# Patient Record
Sex: MALE | Race: OTHER | HISPANIC OR LATINO | Employment: FULL TIME | URBAN - METROPOLITAN AREA
[De-identification: names, ages, dates, MRNs, and addresses within clinical notes are randomized per-mention and may not be internally consistent; named-entity substitution may affect disease eponyms.]

---

## 2018-04-06 ENCOUNTER — HOSPITAL ENCOUNTER (EMERGENCY)
Facility: HOSPITAL | Age: 28
Discharge: HOME | End: 2018-04-06
Admitting: EMERGENCY MEDICINE
Payer: OTHER MISCELLANEOUS

## 2018-04-06 ENCOUNTER — APPOINTMENT (EMERGENCY)
Dept: RADIOLOGY | Facility: HOSPITAL | Age: 28
End: 2018-04-06
Payer: OTHER MISCELLANEOUS

## 2018-04-06 VITALS
OXYGEN SATURATION: 99 % | WEIGHT: 140 LBS | DIASTOLIC BLOOD PRESSURE: 89 MMHG | RESPIRATION RATE: 16 BRPM | SYSTOLIC BLOOD PRESSURE: 139 MMHG | TEMPERATURE: 98.3 F | HEART RATE: 81 BPM
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
## 2018-04-06 DIAGNOSIS — S62.639B OPEN FRACTURE OF TUFT OF DISTAL PHALANX OF FINGER: ICD-10-CM

## 2018-04-06 PROCEDURE — 73140 X-RAY EXAM OF FINGER(S): CPT

## 2018-04-06 PROCEDURE — 90471 IMMUNIZATION ADMIN: CPT

## 2018-04-06 PROCEDURE — 90715 TDAP VACCINE 7 YRS/> IM: CPT | Performed by: PHYSICIAN ASSISTANT

## 2018-04-06 PROCEDURE — 99283 EMERGENCY DEPT VISIT LOW MDM: CPT

## 2018-04-06 RX ORDER — AMOXICILLIN AND CLAVULANATE POTASSIUM 875; 125 MG/1; MG/1
1 TABLET, FILM COATED ORAL EVERY 12 HOURS SCHEDULED
Status: DISCONTINUED | OUTPATIENT
Start: 2018-04-06 | End: 2018-04-06

## 2018-04-06 RX ORDER — IBUPROFEN 400 MG/1
400 TABLET ORAL EVERY 6 HOURS PRN
Qty: 30 TABLET | Refills: 0 | Status: SHIPPED | OUTPATIENT
Start: 2018-04-06

## 2018-04-06 RX ORDER — LIDOCAINE HYDROCHLORIDE 10 MG/ML
5 INJECTION, SOLUTION EPIDURAL; INFILTRATION; INTRACAUDAL; PERINEURAL ONCE
Status: COMPLETED | OUTPATIENT
Start: 2018-04-06 | End: 2018-04-06

## 2018-04-06 RX ORDER — AMOXICILLIN AND CLAVULANATE POTASSIUM 875; 125 MG/1; MG/1
1 TABLET, FILM COATED ORAL EVERY 12 HOURS SCHEDULED
Qty: 20 TABLET | Refills: 0 | Status: SHIPPED | OUTPATIENT
Start: 2018-04-06 | End: 2018-04-16

## 2018-04-06 RX ORDER — ACETAMINOPHEN 500 MG
500 TABLET ORAL EVERY 6 HOURS PRN
Qty: 30 TABLET | Refills: 0 | Status: SHIPPED | OUTPATIENT
Start: 2018-04-06

## 2018-04-06 RX ORDER — ACETAMINOPHEN 325 MG/1
975 TABLET ORAL ONCE
Status: COMPLETED | OUTPATIENT
Start: 2018-04-06 | End: 2018-04-06

## 2018-04-06 RX ORDER — IBUPROFEN 400 MG/1
800 TABLET ORAL ONCE
Status: COMPLETED | OUTPATIENT
Start: 2018-04-06 | End: 2018-04-06

## 2018-04-06 RX ORDER — AMOXICILLIN AND CLAVULANATE POTASSIUM 875; 125 MG/1; MG/1
1 TABLET, FILM COATED ORAL ONCE
Status: COMPLETED | OUTPATIENT
Start: 2018-04-06 | End: 2018-04-06

## 2018-04-06 RX ADMIN — ACETAMINOPHEN 975 MG: 325 TABLET, FILM COATED ORAL at 01:30

## 2018-04-06 RX ADMIN — IBUPROFEN 800 MG: 400 TABLET ORAL at 01:29

## 2018-04-06 RX ADMIN — AMOXICILLIN AND CLAVULANATE POTASSIUM 1 TABLET: 875; 125 TABLET, FILM COATED ORAL at 03:09

## 2018-04-06 RX ADMIN — LIDOCAINE HYDROCHLORIDE 5 ML: 10 INJECTION, SOLUTION EPIDURAL; INFILTRATION; INTRACAUDAL; PERINEURAL at 02:25

## 2018-04-06 RX ADMIN — TETANUS TOXOID, REDUCED DIPHTHERIA TOXOID AND ACELLULAR PERTUSSIS VACCINE, ADSORBED 0.5 ML: 5; 2.5; 8; 8; 2.5 SUSPENSION INTRAMUSCULAR at 01:19

## 2018-04-06 NOTE — ED PROVIDER NOTES
History  Chief Complaint   Patient presents with    Finger Injury     Pt brought in by EMS, reports cutting R index finger on a machine while at work, bleeding controlled upon arrival to ED  Finger Laceration   Location:  Finger  Finger laceration location:  R index finger  Length:  1 cm  Depth: Through dermis  Quality: avulsion and jagged    Bleeding: venous    Laceration mechanism:  Metal edge (machine)  Pain details:     Quality:  Aching, sharp and throbbing    Severity:  Moderate    Timing:  Constant    Progression:  Unchanged  Associated symptoms: no fever        None       History reviewed  No pertinent past medical history  History reviewed  No pertinent surgical history  History reviewed  No pertinent family history  I have reviewed and agree with the history as documented  Social History   Substance Use Topics    Smoking status: Never Smoker    Smokeless tobacco: Never Used    Alcohol use Yes        Review of Systems   Constitutional: Negative for chills and fever  HENT: Negative for ear pain  Eyes: Negative for pain  Respiratory: Negative for chest tightness  Cardiovascular: Negative for chest pain  Gastrointestinal: Negative for abdominal pain  Endocrine: Negative for polydipsia  Genitourinary: Negative for flank pain  Musculoskeletal: Negative for back pain  Skin: Positive for wound  Finger right pointer   Allergic/Immunologic: Negative for food allergies  Neurological: Negative for headaches  Hematological: Negative for adenopathy  Psychiatric/Behavioral: Negative for behavioral problems         Physical Exam  ED Triage Vitals [04/06/18 0044]   Temperature Pulse Respirations Blood Pressure SpO2   98 3 °F (36 8 °C) 81 16 139/89 99 %      Temp Source Heart Rate Source Patient Position - Orthostatic VS BP Location FiO2 (%)   Oral Monitor Lying Left arm --      Pain Score       4           Orthostatic Vital Signs  Vitals:    04/06/18 0044   BP: 139/89 Pulse: 81   Patient Position - Orthostatic VS: Lying       Physical Exam   Constitutional: He is oriented to person, place, and time  He appears well-developed and well-nourished  No distress  HENT:   Head: Normocephalic and atraumatic  Eyes: Conjunctivae are normal    Neck: Neck supple  No JVD present  Cardiovascular: Normal rate  Pulmonary/Chest: Effort normal    Abdominal: Soft  He exhibits no distension  Genitourinary:   Genitourinary Comments: No complaints deferred   Musculoskeletal:   Please see procedure note for description of finger injury as well as pictures   Neurological: He is alert and oriented to person, place, and time  Skin: Skin is warm and dry  Capillary refill takes less than 2 seconds  He is not diaphoretic  ED Medications  Medications   tetanus-diphtheria-acellular pertussis (BOOSTRIX) IM injection 0 5 mL (0 5 mL Intramuscular Given 4/6/18 0119)   ibuprofen (MOTRIN) tablet 800 mg (800 mg Oral Given 4/6/18 0129)   acetaminophen (TYLENOL) tablet 975 mg (975 mg Oral Given 4/6/18 0130)   lidocaine (PF) (XYLOCAINE-MPF) 1 % injection 5 mL (5 mL Infiltration Given by Other 4/6/18 0225)   amoxicillin-clavulanate (AUGMENTIN) 875-125 mg per tablet 1 tablet (1 tablet Oral Given 4/6/18 0309)       Diagnostic Studies  Results Reviewed     None                 XR finger second digit-index RIGHT   Final Result by Benigno Amador DO (04/06 0200)      Mildly displaced fracture of the distal tuft of the 2nd digit            Workstation performed: [id number]                    Procedures  Lac Repair  Date/Time: 4/6/2018 2:25 AM  Performed by: Ankit Heller by: Teddy Ray   Consent: Verbal consent obtained    Risks and benefits: risks, benefits and alternatives were discussed  Consent given by: patient  Patient understanding: patient states understanding of the procedure being performed  Patient consent: the patient's understanding of the procedure matches consent given  Patient identity confirmed: arm band, provided demographic data and verbally with patient  Body area: upper extremity  Location details: right index finger  Laceration length: 1 cm  Foreign bodies: no foreign bodies  Vascular damage: no  Anesthesia: local infiltration    Anesthesia:  Local Anesthetic: lidocaine 1% without epinephrine  Anesthetic total: 3 mL    Sedation:  Patient sedated: no    Wound Dehiscence:  Superficial Wound Dehiscence: simple closure      Procedure Details:  Preparation: Patient was prepped and draped in the usual sterile fashion  Irrigation solution: tap water  Skin closure: 4-0 nylon  Number of sutures: 1  Technique: simple  Approximation: loose             Phone Contacts  ED Phone Contact    ED Course  ED Course                                MDM  Number of Diagnoses or Management Options  Open fracture of tuft of distal phalanx of finger:   Work related injury:   Diagnosis management comments: 70-year-old male presents emergency department for work related injury to finger  X-rays reviewed  Wound cleaning closed without difficulty  One suture was utilized  Patient encouraged to follow up with occupational medicine for this work related injury  I did consider this and opened fracture and place patient on antibiotics  I did educate patient on the reasoning for antibiotics  Patient was educated on persistent or worsening signs of symptoms and to follow up with occupational medicine or return to the emergency department  Patient was also educated on timing for suture removal   Patient admits understanding and agreement  Patient was discharged in stable and improved condition         Amount and/or Complexity of Data Reviewed  Tests in the radiology section of CPT®: ordered and reviewed      CritCare Time    Disposition  Final diagnoses:   Work related injury   Open fracture of tuft of distal phalanx of finger - Right pointer finger     Time reflects when diagnosis was documented in both MDM as applicable and the Disposition within this note     Time User Action Codes Description Comment    4/6/2018  1:12 AM Kamar Bai Add [Y99 0] Work related injury     4/6/2018  2:17 AM Bard David Emanuel Add [S62 639B] Open fracture of tuft of distal phalanx of finger     4/6/2018  2:17 AM Bard David Emanuel Modify [O52 416B] Open fracture of tuft of distal phalanx of finger Right pointer finger      ED Disposition     ED Disposition Condition Comment    Discharge  Varsha Shorten discharge to home/self care  Condition at discharge: Stable        Follow-up Information     Follow up With Specialties Details Why Contact Info    Sarah Harrison MD Occupational Medicine   [address] [address]  [phone]          Discharge Medication List as of 4/6/2018  2:58 AM      START taking these medications    Details   acetaminophen (TYLENOL) 500 mg tablet Take 1 tablet (500 mg total) by mouth every 6 (six) hours as needed for mild pain, moderate pain, headaches or fever, Starting Fri 4/6/2018, Print      amoxicillin-clavulanate (AUGMENTIN) 875-125 mg per tablet Take 1 tablet by mouth every 12 (twelve) hours for 10 days, Starting Fri 4/6/2018, Until Mon 4/16/2018, Print      ibuprofen (MOTRIN) 400 mg tablet Take 1 tablet (400 mg total) by mouth every 6 (six) hours as needed for mild pain, Starting Fri 4/6/2018, Print           No discharge procedures on file      ED Provider  Electronically Signed by           Brady Choi PA-C  04/09/18 0413

## 2018-04-06 NOTE — DISCHARGE INSTRUCTIONS
Fractura de dedo   CUIDADO AMBULATORIO:   Paige fractura de dedo  es la [place] de 1 o más huesos del dedo de ennis hijo  La causa más común es un golpe directo al dedo  Los signos y síntomas más comunes incluyen los siguientes:   · Moretones, dolor o inflamación    · Debilidad o entumecimiento    · Dificultad para  ennis dedo    · El dedo se ve de forma anormal  Busque atención médica de inmediato si:   · El yeso o la férula se moja, se daña o se le   · El yeso o la férula se sienten muy apretados  · Usted tiene dolor intenso  · El dedo lesionado se encuentra entumecido frío o pálido  Comuníquese con ennis médico o especialista de la mano si:   · Aun después del tratamiento, empeora ennis dolor o inflamación  · Usted tiene preguntas o inquietudes acerca de ennis condición o cuidado  Tratamiento:   · Nasima Priestly o un yeso  puede ser necesaria para prevenir movimiento y proteger el dedo por lo que Sherrian Antigo  · AINEs (Analgésicos antiinflamatorios no esteroides) rey el ibuprofeno, ayudan a disminuir la inflamación, el dolor y la [place]  Amada medicamento esta disponible con o sin paige receta médica  Los AINEs pueden causar sangrado estomacal o problemas renales en ciertas personas  Si usted roxana un medicamento anticoagulante, siempre pregúntele a ennis médico si los ROSEANN son seguros para usted  Siempre mikaela la etiqueta de amada medicamento y [place] instrucciones  · El acetaminofén  [place] el dolor y baja la fiebre  Está disponible sin receta médica  Pregunte la cantidad y la frecuencia con que debe tomarlos  [address]  El acetaminofén puede causar daño en el hígado cuando no se roxana de forma correcta  · Un medicamento con receta para el dolor  podrían ser Dejan Mario  Pregunte al médico cómo debe dom amada medicamento de forma sargent  Algunos medicamentos recetados para el dolor contienen acetaminofén   No tome otros medicamentos que contengan acetaminofén sin consultarlo con ennis médico  Demasiado acetaminofeno puede causar daño al hígado  Los medicamentos recetados para el dolor podrían causar estreñimiento  Pregunte a ennis médico rey prevenir o tratar estreñimiento  · Reducción cerrada  se puede hacer para volver a colocar los Daryl's posición correcta sin necesidad de [place]  · Cirugía de reducción abierta  podría ser necesaria para regresar luciano huesos a ennis posición correcta  Se realiza jennifer incisión y los huesos se vuelven a colocar en la posición correcta  Evart podría incluir el uso de cables, clavos, placas o tornillos especiales  Estos instrumentos sirven para [employer] piezas del hueso alineadas para permitir que el dedo sane adecuadamente  Cuidados personales:   · Use ennis férula rey se le indique  No retire la férula sin la autorización del médico o especialista de [place]  · Aplique hielo  en el dedo de 15 a 20 minutos cada hora o rey se le indique  Use un paquete de hielo o ponga hielo molido dentro de [employer]  Cúbralo con jennifer toalla antes de aplicarlo sobre ennis piel  El hielo ayuda a evitar daño al tejido y a disminuir la inflamación y el dolor  · Eleve  el dedo por encima del nivel del corazón con la mayor frecuencia posible  [place] va a disminuir inflamación y el dolor  Coloque ennis mano sobre almohadas o cobijas para mantenerlas elevadas cómodamente  · Vaya a terapia física según indicaciones  Un fisioterapeuta le puede enseñar ejercicios para ayudarle a mejorar el movimiento y la fuerza, y para disminuir el dolor  Programe jennifer jose de seguimiento con ennis médico o especialista de o tonny dentro de 2 días:  Anote luciano preguntas para que se acuerde de hacerlas eric luciano visitas  © 2017 [address] Information is for End User's use only and may not be sold, redistributed or otherwise used for commercial purposes   All illustrations and images included in CareNotes® are the copyrighted property of A D A M , Inc  or [employer]  Esta información es sólo para uso en educación  Ennis intención no es darle un consejo médico sobre enfermedades o tratamientos  Colsulte con ennis Eryn Garrick farmacéutico antes de seguir cualquier régimen médico para saber si es seguro y efectivo para usted

## 2018-04-09 ENCOUNTER — APPOINTMENT (OUTPATIENT)
Dept: URGENT CARE | Facility: MEDICAL CENTER | Age: 28
End: 2018-04-09
Payer: OTHER MISCELLANEOUS

## 2018-04-09 PROCEDURE — G0382 LEV 3 HOSP TYPE B ED VISIT: HCPCS | Performed by: PHYSICIAN ASSISTANT

## 2018-04-09 PROCEDURE — 99283 EMERGENCY DEPT VISIT LOW MDM: CPT | Performed by: PHYSICIAN ASSISTANT

## 2018-04-25 ENCOUNTER — APPOINTMENT (OUTPATIENT)
Dept: URGENT CARE | Facility: MEDICAL CENTER | Age: 28
End: 2018-04-25
Payer: OTHER MISCELLANEOUS

## 2018-04-25 PROCEDURE — 99213 OFFICE O/P EST LOW 20 MIN: CPT | Performed by: PHYSICIAN ASSISTANT

## 2023-11-20 ENCOUNTER — OFFICE VISIT (OUTPATIENT)
Dept: URGENT CARE | Age: 33
End: 2023-11-20
Payer: COMMERCIAL

## 2023-11-20 VITALS
WEIGHT: 152.4 LBS | SYSTOLIC BLOOD PRESSURE: 125 MMHG | TEMPERATURE: 97.5 F | HEART RATE: 70 BPM | DIASTOLIC BLOOD PRESSURE: 77 MMHG | RESPIRATION RATE: 18 BRPM | OXYGEN SATURATION: 100 %

## 2023-11-20 DIAGNOSIS — R59.1 LYMPHADENOPATHY: Primary | ICD-10-CM

## 2023-11-20 PROCEDURE — G0382 LEV 3 HOSP TYPE B ED VISIT: HCPCS | Performed by: EMERGENCY MEDICINE

## 2023-11-20 PROCEDURE — S9083 URGENT CARE CENTER GLOBAL: HCPCS | Performed by: EMERGENCY MEDICINE

## 2023-11-21 NOTE — PROGRESS NOTES
[facility]        NAME: Elizabeth Nolasco is a 34 y.o. male  : 1990    MRN: [id number]  DATE: 2023  TIME: 8:25 PM    Assessment and Plan   Lymphadenopathy [R59.1]  1. Lymphadenopathy  Ambulatory Referral to [facility]        -Shotty lymphadenopathy noted in the left pectoral and axillary regions  -Patient otherwise afebrile without evidence of current infection  -We will give referral to primary care physician for further work-up and evaluation and follow-up  -Advised patient to seek care in ED if symptoms worsen  -All questions answered at bedside, patient amenable to plan and voiced understanding    Patient Instructions       Follow up with PCP in 3-5 days. Proceed to  ER if symptoms worsen. Chief Complaint     Chief Complaint   Patient presents with    Abdominal Pain     Pt feels lump like mass under skin. Located under left side of chest. Started 4 years ago. Got worse 4 months ago. History of Present Illness       46-year-old male, previously healthy presents with chief complaint of feeling a "lump" in his left chest wall. Patient states that he has experienced this sensation and feeling intermittently for the last 4 years and has been acutely worse within the past 4 months. He does endorse some recent unintended weight loss and denies night sweats, fevers, chest pain or shortness of breath. Patient currently does not have a primary care physician and has not been to a physician in several years. Additionally, denies nausea, vomiting, diarrhea, abdominal pain. Denies recent upper respiratory infection or sore throat or wounds. Other  This is a new problem. The current episode started more than 1 year ago. The problem has been waxing and waning.  Pertinent negatives include no abdominal pain, anorexia, arthralgias, change in bowel habit, chest pain, chills, congestion, coughing, diaphoresis, fatigue, fever, headaches, joint swelling, myalgias, nausea, neck pain, numbness, rash, sore throat, swollen glands, urinary symptoms, vertigo, visual change, vomiting or weakness. Review of Systems   Review of Systems   Constitutional:  Positive for unexpected weight change. Negative for activity change, appetite change, chills, diaphoresis, fatigue and fever. HENT:  Negative for congestion, dental problem, drooling, ear discharge, ear pain, facial swelling, hearing loss, mouth sores, nosebleeds, postnasal drip, rhinorrhea, sinus pressure, sinus pain, sneezing and sore throat. Eyes:  Negative for photophobia, pain, discharge, redness, itching and visual disturbance. Respiratory:  Negative for apnea, cough, choking, chest tightness, shortness of breath, wheezing and stridor. Cardiovascular:  Negative for chest pain, palpitations and leg swelling. Gastrointestinal:  Negative for abdominal pain, anorexia, change in bowel habit, constipation, diarrhea, nausea, rectal pain and vomiting. Genitourinary:  Negative for dysuria and hematuria. Musculoskeletal:  Negative for arthralgias, back pain, gait problem, joint swelling, myalgias, neck pain and neck stiffness. Skin:  Negative for color change and rash. Neurological:  Negative for vertigo, seizures, syncope, weakness, numbness and headaches. Hematological:  Positive for adenopathy. Does not bruise/bleed easily. All other systems reviewed and are negative.         Current Medications       Current Outpatient Medications:     acetaminophen (TYLENOL) 500 mg tablet, Take 1 tablet (500 mg total) by mouth every 6 (six) hours as needed for mild pain, moderate pain, headaches or fever (Patient not taking: Reported on 11/20/2023), Disp: 30 tablet, Rfl: 0    ibuprofen (MOTRIN) 400 mg tablet, Take 1 tablet (400 mg total) by mouth every 6 (six) hours as needed for mild pain (Patient not taking: Reported on 11/20/2023), Disp: 30 tablet, Rfl: 0    Current Allergies     Allergies as of 11/20/2023    (No Known Allergies) The following portions of the patient's history were reviewed and updated as appropriate: allergies, current medications, past family history, past medical history, past social history, past surgical history and problem list.     History reviewed. No pertinent past medical history. History reviewed. No pertinent surgical history. History reviewed. No pertinent family history. Medications have been verified. Objective   /77   Pulse 70   Temp 97.5 °F (36.4 °C) (Tympanic)   Resp 18   Wt 69.1 kg (152 lb 6.4 oz)   SpO2 100%   No LMP for male patient. Physical Exam     Physical Exam  Vitals and nursing note reviewed. Constitutional:       General: He is not in acute distress. Appearance: Normal appearance. He is obese. He is not ill-appearing, toxic-appearing or diaphoretic. HENT:      Head: Normocephalic and atraumatic. Right Ear: External ear normal.      Left Ear: External ear normal.      Nose: Nose normal.      Mouth/Throat:      Mouth: Mucous membranes are moist.      Pharynx: Oropharynx is clear. No pharyngeal swelling or oropharyngeal exudate. Eyes:      General: No scleral icterus. Extraocular Movements: Extraocular movements intact. Pupils: Pupils are equal, round, and reactive to light. Cardiovascular:      Rate and Rhythm: Normal rate and regular rhythm. Pulses: Normal pulses. Heart sounds: Normal heart sounds. No murmur heard. No friction rub. No gallop. Pulmonary:      Effort: Pulmonary effort is normal. No respiratory distress. Breath sounds: Normal breath sounds. No stridor. No wheezing, rhonchi or rales. Chest:      Chest wall: No tenderness. Abdominal:      General: Abdomen is flat. Bowel sounds are normal. There is no distension or abdominal bruit. There are no signs of injury. Palpations: Abdomen is soft. There is no shifting dullness, fluid wave, hepatomegaly, splenomegaly, mass or pulsatile mass. Tenderness: There is no abdominal tenderness. There is no right CVA tenderness, left CVA tenderness, guarding or rebound. Negative signs include Serrano's sign, Rovsing's sign, McBurney's sign, psoas sign and obturator sign. Hernia: No hernia is present. Musculoskeletal:         General: No swelling, tenderness, deformity or signs of injury. Cervical back: Neck supple. Left lower leg: No edema. Lymphadenopathy:      Head:      Right side of head: No submental, submandibular, tonsillar, preauricular, posterior auricular or occipital adenopathy. Left side of head: No submental, submandibular, tonsillar, preauricular, posterior auricular or occipital adenopathy. Cervical: No cervical adenopathy. Right cervical: No superficial, deep or posterior cervical adenopathy. Left cervical: No superficial, deep or posterior cervical adenopathy. Upper Body:      Right upper body: No supraclavicular, axillary, pectoral or epitrochlear adenopathy. Left upper body: Axillary adenopathy and pectoral adenopathy present. No supraclavicular or epitrochlear adenopathy. Lower Body: No right inguinal adenopathy. No left inguinal adenopathy. Skin:     General: Skin is warm and dry. Capillary Refill: Capillary refill takes less than 2 seconds. Neurological:      General: No focal deficit present. Mental Status: He is alert and oriented to person, place, and time. Cranial Nerves: No cranial nerve deficit. Motor: No weakness.       Coordination: Coordination normal.      Gait: Gait normal.      Deep Tendon Reflexes: Reflexes normal.   Psychiatric:         Behavior: Behavior normal.